# Patient Record
Sex: MALE | Race: WHITE | NOT HISPANIC OR LATINO | Employment: OTHER | URBAN - METROPOLITAN AREA
[De-identification: names, ages, dates, MRNs, and addresses within clinical notes are randomized per-mention and may not be internally consistent; named-entity substitution may affect disease eponyms.]

---

## 2023-12-29 ENCOUNTER — CLINICAL SUPPORT (OUTPATIENT)
Dept: PRIMARY CARE | Facility: CLINIC | Age: 73
End: 2023-12-29
Payer: MEDICARE

## 2023-12-29 DIAGNOSIS — Z23 NEED FOR PROPHYLACTIC VACCINATION AND INOCULATION AGAINST INFLUENZA: ICD-10-CM

## 2023-12-29 PROCEDURE — 90662 IIV NO PRSV INCREASED AG IM: CPT | Performed by: INTERNAL MEDICINE

## 2023-12-29 PROCEDURE — G0008 ADMIN INFLUENZA VIRUS VAC: HCPCS | Performed by: INTERNAL MEDICINE

## 2024-07-08 ENCOUNTER — PATIENT OUTREACH (OUTPATIENT)
Dept: PRIMARY CARE | Facility: CLINIC | Age: 74
End: 2024-07-08
Payer: MEDICARE

## 2024-07-08 NOTE — PROGRESS NOTES
Discharge facility: [facility]  Discharge diagnosis: Atrial fibrillation, persistent    Acute decompensated heart failure   Admission date: 7/2/24  Discharge date: 7/6/24    PCP Appointment Date: none scheduled, attempted x 3 to reach patient and patient's wife  Specialist Appointment Date: 8/7/24 [facility] cardiology  Hospital Encounter and Summary: Linked    Wrap Up  Wrap Up Additional Comments: Call to patient 3 attempts. Patient stated he is unable to talk. Encouraged him to call me back when he is able to talk (7/8/2024  2:27 PM)       Three attempts were made to reach patient within two business days after discharge. Voicemail left with contact information for patient to call back with any non-emergent questions or concerns.

## 2024-07-25 ENCOUNTER — PATIENT OUTREACH (OUTPATIENT)
Dept: PRIMARY CARE | Facility: CLINIC | Age: 74
End: 2024-07-25
Payer: MEDICARE

## 2024-07-25 NOTE — PROGRESS NOTES
Call after hospitalization. Spoke with patient's wife. She reports patient is doing alright. She states she is planning to call to schedule a follow up appt with PCP next week. She states patient has not been very mobile .  Addressed any questions or concerns.

## 2024-08-19 ENCOUNTER — PATIENT OUTREACH (OUTPATIENT)
Dept: PRIMARY CARE | Facility: CLINIC | Age: 74
End: 2024-08-19
Payer: MEDICARE

## 2024-08-19 RX ORDER — ACETAMINOPHEN 325 MG/1
650 TABLET ORAL EVERY 6 HOURS PRN
COMMUNITY

## 2024-08-19 RX ORDER — CHOLECALCIFEROL (VITAMIN D3) 25 MCG
1 TABLET,CHEWABLE ORAL
COMMUNITY

## 2024-08-19 RX ORDER — LANOLIN ALCOHOL/MO/W.PET/CERES
100 CREAM (GRAM) TOPICAL
COMMUNITY
Start: 2024-08-16

## 2024-08-19 RX ORDER — LOPERAMIDE HYDROCHLORIDE 2 MG/1
2 CAPSULE ORAL EVERY 8 HOURS PRN
COMMUNITY
Start: 2024-07-06

## 2024-08-19 RX ORDER — METOPROLOL SUCCINATE 50 MG/1
25 TABLET, EXTENDED RELEASE ORAL 2 TIMES DAILY
COMMUNITY
Start: 2024-03-21

## 2024-08-19 RX ORDER — ASCORBIC ACID 125 MG
500 TABLET,CHEWABLE ORAL
COMMUNITY

## 2024-08-19 RX ORDER — ATORVASTATIN CALCIUM 80 MG/1
1 TABLET, FILM COATED ORAL NIGHTLY
COMMUNITY
Start: 2023-11-07

## 2024-08-19 RX ORDER — METOLAZONE 2.5 MG/1
TABLET ORAL
COMMUNITY
Start: 2024-08-16

## 2024-08-19 RX ORDER — TORSEMIDE 20 MG/1
80 TABLET ORAL 2 TIMES DAILY
COMMUNITY
Start: 2024-04-25

## 2024-08-19 RX ORDER — POTASSIUM CHLORIDE 20 MEQ/1
40 TABLET, EXTENDED RELEASE ORAL 3 TIMES DAILY
COMMUNITY
Start: 2024-08-16

## 2024-08-19 RX ORDER — HYDROCODONE BITARTRATE AND ACETAMINOPHEN 5; 325 MG/1; MG/1
1 TABLET ORAL EVERY 6 HOURS PRN
COMMUNITY
Start: 2024-06-10

## 2024-08-19 RX ORDER — EPLERENONE 25 MG/1
25 TABLET, FILM COATED ORAL
COMMUNITY
Start: 2024-03-21

## 2024-08-19 NOTE — PROGRESS NOTES
Discharge Facility: [facility]  Discharge Diagnosis: Acute decompensated heart failure (HCC)   Acute on chronic heart failure with preserved ejection fraction  Admission Date: 8/7/24  Discharge Date: 8/16/24    PCP Appointment Date: 8/20/24  Specialist Appointment Date: Unknown  Hospital Encounter and Summary Linked: Yes    See discharge assessment below for further details    Engagement  Call Start Time: 1234 (8/19/2024 12:36 PM)    Medications  Medications reviewed with patient/caregiver?: Yes (8/19/2024 12:36 PM)  Is the patient having any side effects they believe may be caused by any medication additions or changes?: No (8/19/2024 12:36 PM)  Does the patient have all medications ordered at discharge?: Yes (8/19/2024 12:36 PM)  Care Management Interventions: No intervention needed (8/19/2024 12:36 PM)  Prescription Comments: pt sent home with script (8/19/2024 12:36 PM)  Is the patient taking all medications as directed (includes completed medication regime)?: Yes (8/19/2024 12:36 PM)  Care Management Interventions: Provided patient education (8/19/2024 12:36 PM)  Medication Comments: Pt denies problems obtaining or affording medication (8/19/2024 12:36 PM)    Appointments  Does the patient have a primary care provider?: Yes (8/19/2024 12:36 PM)  Care Management Interventions: Verified appointment date/time/provider (8/19/2024 12:36 PM)  Has the patient kept scheduled appointments due by today?: Yes (8/19/2024 12:36 PM)  Care Management Interventions: Advised patient to keep appointment (8/19/2024 12:36 PM)    Self Management  What is the home health agency?: n/a (8/19/2024 12:36 PM)  Has home health visited the patient within 72 hours of discharge?: Not applicable (8/19/2024 12:36 PM)    Patient Teaching  Does the patient have access to their discharge instructions?: Yes (8/19/2024 12:36 PM)  Care Management Interventions: Reviewed instructions with patient (8/19/2024 12:36 PM)  What is the patient's  perception of their health status since discharge?: Improving (8/19/2024 12:36 PM)  Is the patient/caregiver able to teach back the hierarchy of who to call/visit for symptoms/problems? PCP, Specialist, Home Health nurse, Urgent Care, ED, 911: Yes (8/19/2024 12:36 PM)    Wrap Up  Wrap Up Additional Comments: This CM spoke with pts spouse via phone. Spouse reports pt doing well at home since discharge. New meds reviewed. Spouse reports they have picked up all new meds and have no questions at this time. Spouse aware of my availability for non-emergent concerns. Contact info provided. (8/19/2024 12:36 PM)      Cristóbal Thompson LPN

## 2024-08-20 ENCOUNTER — TELEPHONE (OUTPATIENT)
Dept: PRIMARY CARE | Facility: CLINIC | Age: 74
End: 2024-08-20

## 2024-08-20 DIAGNOSIS — I10 PRIMARY HYPERTENSION: Primary | ICD-10-CM

## 2024-08-20 NOTE — TELEPHONE ENCOUNTER
[place] instead for hosp discharge appt.   He  BW orders to monitor kidney function/diaretics and potassium  He was in there for heart failure

## 2024-08-22 LAB — MAGNESIUM (MG/DL) IN SER/PLAS EXTERNAL: 1.9 MG/DL

## 2024-08-28 ENCOUNTER — TELEPHONE (OUTPATIENT)
Dept: PRIMARY CARE | Facility: CLINIC | Age: 74
End: 2024-08-28
Payer: MEDICARE

## 2024-08-28 NOTE — TELEPHONE ENCOUNTER
Called [facility] - PT did not have CMP drawn (did not bring that order to [facility]) when he went and had magnesium drawn.   I called PT and left vmail informing he will need to go back to lab for redraw. Too many days passed to have them add on that order.

## 2024-08-28 NOTE — TELEPHONE ENCOUNTER
----- Message from Wilfredo Calixto sent at 8/28/2024  9:09 AM EDT -----  Where is his cmp?  ----- Message -----  From: Juli Felix MA  Sent: 8/23/2024   7:41 AM EDT  To: Wilfredo Calixto MD    Magnesium results

## 2024-08-29 ENCOUNTER — PATIENT OUTREACH (OUTPATIENT)
Dept: PRIMARY CARE | Facility: CLINIC | Age: 74
End: 2024-08-29
Payer: MEDICARE

## 2024-08-29 NOTE — PROGRESS NOTES
Call regarding appt. with PCP on (N/A) after hospitalization.  At time of outreach call the patient feels as if their condition has (improved) since last visit.  Reviewed the PCP appointment with the pt and addressed any questions or concerns.    Cristóbal Thompson LPN

## 2024-10-02 ENCOUNTER — PATIENT OUTREACH (OUTPATIENT)
Dept: PRIMARY CARE | Facility: CLINIC | Age: 74
End: 2024-10-02
Payer: MEDICARE

## 2024-10-02 NOTE — PROGRESS NOTES
Unable to reach patient for discharge follow up call.   LVM with call back number for patient to call if needed   If no voicemail available call attempts x 2 were made to contact the patient to assist with any questions or concerns patient may have.    Cristóbal Thompson LPN

## 2024-10-30 ENCOUNTER — LAB (OUTPATIENT)
Dept: LAB | Facility: LAB | Age: 74
End: 2024-10-30
Payer: MEDICARE

## 2024-10-30 ENCOUNTER — APPOINTMENT (OUTPATIENT)
Dept: PRIMARY CARE | Facility: CLINIC | Age: 74
End: 2024-10-30
Payer: MEDICARE

## 2024-10-30 VITALS
HEART RATE: 87 BPM | SYSTOLIC BLOOD PRESSURE: 106 MMHG | OXYGEN SATURATION: 93 % | DIASTOLIC BLOOD PRESSURE: 60 MMHG | TEMPERATURE: 97.4 F | WEIGHT: 244 LBS | HEIGHT: 64 IN | BODY MASS INDEX: 41.66 KG/M2

## 2024-10-30 DIAGNOSIS — Z95.0 STATUS POST BIVENTRICULAR PACEMAKER: ICD-10-CM

## 2024-10-30 DIAGNOSIS — I25.10 CORONARY ARTERY DISEASE DUE TO LIPID RICH PLAQUE: ICD-10-CM

## 2024-10-30 DIAGNOSIS — I10 PRIMARY HYPERTENSION: ICD-10-CM

## 2024-10-30 DIAGNOSIS — M10.9 GOUT, UNSPECIFIED CAUSE, UNSPECIFIED CHRONICITY, UNSPECIFIED SITE: ICD-10-CM

## 2024-10-30 DIAGNOSIS — O99.019: ICD-10-CM

## 2024-10-30 DIAGNOSIS — Z00.00 WELLNESS EXAMINATION: ICD-10-CM

## 2024-10-30 DIAGNOSIS — N40.0 BENIGN PROSTATIC HYPERPLASIA WITHOUT LOWER URINARY TRACT SYMPTOMS: ICD-10-CM

## 2024-10-30 DIAGNOSIS — D64.9 ANEMIA, UNSPECIFIED TYPE: ICD-10-CM

## 2024-10-30 DIAGNOSIS — R53.81 DEBILITY: ICD-10-CM

## 2024-10-30 DIAGNOSIS — Z00.00 MEDICARE ANNUAL WELLNESS VISIT, INITIAL: Primary | ICD-10-CM

## 2024-10-30 DIAGNOSIS — G89.4 CHRONIC PAIN SYNDROME: ICD-10-CM

## 2024-10-30 DIAGNOSIS — F41.9 ANXIETY: ICD-10-CM

## 2024-10-30 DIAGNOSIS — K21.9 GASTROESOPHAGEAL REFLUX DISEASE WITHOUT ESOPHAGITIS: ICD-10-CM

## 2024-10-30 DIAGNOSIS — E78.2 MIXED HYPERLIPIDEMIA: ICD-10-CM

## 2024-10-30 DIAGNOSIS — I50.82 BIVENTRICULAR CONGESTIVE HEART FAILURE: ICD-10-CM

## 2024-10-30 DIAGNOSIS — Z95.2 S/P AVR: ICD-10-CM

## 2024-10-30 DIAGNOSIS — I87.2 VENOUS STASIS DERMATITIS OF BOTH LOWER EXTREMITIES: ICD-10-CM

## 2024-10-30 DIAGNOSIS — M19.90 ARTHRITIS: ICD-10-CM

## 2024-10-30 DIAGNOSIS — I50.41 ACUTE COMBINED SYSTOLIC (CONGESTIVE) AND DIASTOLIC (CONGESTIVE) HEART FAILURE: ICD-10-CM

## 2024-10-30 DIAGNOSIS — I25.83 CORONARY ARTERY DISEASE DUE TO LIPID RICH PLAQUE: ICD-10-CM

## 2024-10-30 DIAGNOSIS — I27.20 PULMONARY HYPERTENSION (MULTI): ICD-10-CM

## 2024-10-30 DIAGNOSIS — N28.9 RENAL INSUFFICIENCY: ICD-10-CM

## 2024-10-30 DIAGNOSIS — Z00.00 MEDICARE ANNUAL WELLNESS VISIT, INITIAL: ICD-10-CM

## 2024-10-30 DIAGNOSIS — O09.40: ICD-10-CM

## 2024-10-30 DIAGNOSIS — R73.02 IGT (IMPAIRED GLUCOSE TOLERANCE): ICD-10-CM

## 2024-10-30 DIAGNOSIS — F10.10 ALCOHOL ABUSE: ICD-10-CM

## 2024-10-30 DIAGNOSIS — C61 PROSTATE CANCER (MULTI): ICD-10-CM

## 2024-10-30 LAB
ALBUMIN SERPL BCP-MCNC: 3.9 G/DL (ref 3.4–5)
ALP SERPL-CCNC: 131 U/L (ref 33–136)
ALT SERPL W P-5'-P-CCNC: 12 U/L (ref 10–52)
ANION GAP SERPL CALC-SCNC: 20 MMOL/L (ref 10–20)
AST SERPL W P-5'-P-CCNC: 15 U/L (ref 9–39)
BILIRUB SERPL-MCNC: 0.4 MG/DL (ref 0–1.2)
BNP SERPL-MCNC: 360 PG/ML (ref 0–99)
BUN SERPL-MCNC: 75 MG/DL (ref 6–23)
CALCIUM SERPL-MCNC: 8.7 MG/DL (ref 8.6–10.3)
CHLORIDE SERPL-SCNC: 94 MMOL/L (ref 98–107)
CO2 SERPL-SCNC: 29 MMOL/L (ref 21–32)
CREAT SERPL-MCNC: 1.7 MG/DL (ref 0.5–1.3)
EGFRCR SERPLBLD CKD-EPI 2021: 42 ML/MIN/1.73M*2
ERYTHROCYTE [DISTWIDTH] IN BLOOD BY AUTOMATED COUNT: 14.6 % (ref 11.5–14.5)
GLUCOSE SERPL-MCNC: 126 MG/DL (ref 74–99)
HCT VFR BLD AUTO: 37.1 % (ref 41–52)
HGB BLD-MCNC: 11.5 G/DL (ref 13.5–17.5)
MAGNESIUM SERPL-MCNC: 1.98 MG/DL (ref 1.6–2.4)
MCH RBC QN AUTO: 32.1 PG (ref 26–34)
MCHC RBC AUTO-ENTMCNC: 31 G/DL (ref 32–36)
MCV RBC AUTO: 104 FL (ref 80–100)
NRBC BLD-RTO: 0 /100 WBCS (ref 0–0)
PLATELET # BLD AUTO: 107 X10*3/UL (ref 150–450)
POTASSIUM SERPL-SCNC: 4.2 MMOL/L (ref 3.5–5.3)
PROT SERPL-MCNC: 6.2 G/DL (ref 6.4–8.2)
RBC # BLD AUTO: 3.58 X10*6/UL (ref 4.5–5.9)
SODIUM SERPL-SCNC: 139 MMOL/L (ref 136–145)
WBC # BLD AUTO: 10.3 X10*3/UL (ref 4.4–11.3)

## 2024-10-30 PROCEDURE — G0439 PPPS, SUBSEQ VISIT: HCPCS | Performed by: INTERNAL MEDICINE

## 2024-10-30 PROCEDURE — 83880 ASSAY OF NATRIURETIC PEPTIDE: CPT

## 2024-10-30 PROCEDURE — G0008 ADMIN INFLUENZA VIRUS VAC: HCPCS | Performed by: INTERNAL MEDICINE

## 2024-10-30 PROCEDURE — 1124F ACP DISCUSS-NO DSCNMKR DOCD: CPT | Performed by: INTERNAL MEDICINE

## 2024-10-30 PROCEDURE — 99397 PER PM REEVAL EST PAT 65+ YR: CPT | Performed by: INTERNAL MEDICINE

## 2024-10-30 PROCEDURE — 80053 COMPREHEN METABOLIC PANEL: CPT

## 2024-10-30 PROCEDURE — 90662 IIV NO PRSV INCREASED AG IM: CPT | Performed by: INTERNAL MEDICINE

## 2024-10-30 PROCEDURE — 85027 COMPLETE CBC AUTOMATED: CPT

## 2024-10-30 PROCEDURE — 83735 ASSAY OF MAGNESIUM: CPT

## 2024-10-30 PROCEDURE — 1170F FXNL STATUS ASSESSED: CPT | Performed by: INTERNAL MEDICINE

## 2024-10-30 PROCEDURE — 1159F MED LIST DOCD IN RCRD: CPT | Performed by: INTERNAL MEDICINE

## 2024-10-30 PROCEDURE — 3008F BODY MASS INDEX DOCD: CPT | Performed by: INTERNAL MEDICINE

## 2024-10-30 PROCEDURE — 3078F DIAST BP <80 MM HG: CPT | Performed by: INTERNAL MEDICINE

## 2024-10-30 PROCEDURE — 3074F SYST BP LT 130 MM HG: CPT | Performed by: INTERNAL MEDICINE

## 2024-10-30 PROCEDURE — 99213 OFFICE O/P EST LOW 20 MIN: CPT | Performed by: INTERNAL MEDICINE

## 2024-10-30 PROCEDURE — 36415 COLL VENOUS BLD VENIPUNCTURE: CPT

## 2024-10-30 RX ORDER — OXYCODONE AND ACETAMINOPHEN 5; 325 MG/1; MG/1
TABLET ORAL EVERY 6 HOURS
COMMUNITY
Start: 2024-02-05

## 2024-10-30 RX ORDER — TAMSULOSIN HYDROCHLORIDE 0.4 MG/1
0.8 CAPSULE ORAL
COMMUNITY
Start: 2024-06-26

## 2024-10-30 RX ORDER — GABAPENTIN 300 MG/1
300 CAPSULE ORAL 2 TIMES DAILY
COMMUNITY
Start: 2024-10-02

## 2024-10-30 RX ORDER — ALLOPURINOL 300 MG/1
300 TABLET ORAL EVERY 24 HOURS
COMMUNITY

## 2024-10-30 ASSESSMENT — PATIENT HEALTH QUESTIONNAIRE - PHQ9
SUM OF ALL RESPONSES TO PHQ9 QUESTIONS 1 AND 2: 0
1. LITTLE INTEREST OR PLEASURE IN DOING THINGS: NOT AT ALL
2. FEELING DOWN, DEPRESSED OR HOPELESS: NOT AT ALL

## 2024-10-30 ASSESSMENT — ACTIVITIES OF DAILY LIVING (ADL)
DOING_HOUSEWORK: NEEDS ASSISTANCE
GROCERY_SHOPPING: NEEDS ASSISTANCE
DRESSING: NEEDS ASSISTANCE
MANAGING_FINANCES: NEEDS ASSISTANCE
BATHING: NEEDS ASSISTANCE
TAKING_MEDICATION: NEEDS ASSISTANCE

## 2024-11-13 ENCOUNTER — PATIENT OUTREACH (OUTPATIENT)
Dept: PRIMARY CARE | Facility: CLINIC | Age: 74
End: 2024-11-13
Payer: MEDICARE

## 2024-11-13 NOTE — PROGRESS NOTES
Patient has met target of no readmission for (90) days post (hospital, SNF, rehab) discharge and is graduated from Transitional Care Management program at this time.    Cristóbal Thompson LPN

## 2025-02-24 ENCOUNTER — DOCUMENTATION (OUTPATIENT)
Dept: PRIMARY CARE | Facility: CLINIC | Age: 75
End: 2025-02-24
Payer: MEDICARE

## 2025-02-24 ENCOUNTER — PATIENT OUTREACH (OUTPATIENT)
Dept: PRIMARY CARE | Facility: CLINIC | Age: 75
End: 2025-02-24
Payer: MEDICARE

## 2025-02-24 NOTE — PROGRESS NOTES
Discharge Facility:[facility]  Discharge Diagnosis:  Acute on Chronic CHF  Venous Stasis B/L LE  CKD 3b  Anxiety/Depression  Debility    Admission Date:1/9/2025  Discharge Date: 2/22/2025    PCP Appointment Date:TBD  Specialist Appointment Date:   3/21/2025 Cardio/[facility]  Urology to remove [facility] Encounter and Summary Linked: Yes  [facility]: 12/18/2024-1/9/2025  See discharge assessment below for further details  Wrap Up  Wrap Up Additional Comments: -- (Spoke with wife Samantha. She states Nilton is trying to reaclimate to being at home, he is very weak, got Covid at Rehab. Has not been able to walk or stand very long. They are monitoring PO2, which has been good.) (2/24/2025  3:31 PM)    Engagement  Call Start Time: 1520 (2/24/2025  3:31 PM)    Medications  Medications reviewed with patient/caregiver?: Yes (2/24/2025  3:31 PM)  Is the patient having any side effects they believe may be caused by any medication additions or changes?: No (2/24/2025  3:31 PM)  Does the patient have all medications ordered at discharge?: Not applicable (2/24/2025  3:31 PM)  Care Management Interventions: No intervention needed (2/24/2025  3:31 PM)  Prescription Comments: -- (N/A) (2/24/2025  3:31 PM)  Is the patient taking all medications as directed (includes completed medication regime)?: Yes (2/24/2025  3:31 PM)  Medication Comments: -- (N/A) (2/24/2025  3:31 PM)    Appointments  Does the patient have a primary care provider?: Yes (2/24/2025  3:31 PM)  Care Management Interventions: -- (Wants to wait a few days before scheduling.) (2/24/2025  3:31 PM)  Has the patient kept scheduled appointments due by today?: Yes (2/24/2025  3:31 PM)    Self Management  What is the home health agency?: -- ([facility]) (2/24/2025  3:31 PM)  Has home health visited the patient within 72 hours of discharge?: Call prior to 72 hours (To call later today to schedule first visit) (2/24/2025  3:31 PM)  What  Durable Medical Equipment (DME) was ordered?: -- (Using wheelchair mostly for mobility) (2/24/2025  3:31 PM)    Patient Teaching  Does the patient have access to their discharge instructions?: Yes (2/24/2025  3:31 PM)  Care Management Interventions: Reviewed instructions with patient (To do daily weights and keep record, Low sodium diet) (2/24/2025  3:31 PM)  What is the patient's perception of their health status since discharge?: Improving (2/24/2025  3:31 PM)  Is the patient/caregiver able to teach back the hierarchy of who to call/visit for symptoms/problems? PCP, Specialist, Home Health nurse, Urgent Care, ED, 911: Yes (2/24/2025  3:31 PM)

## 2025-03-10 ENCOUNTER — PATIENT OUTREACH (OUTPATIENT)
Dept: PRIMARY CARE | Facility: CLINIC | Age: 75
End: 2025-03-10
Payer: MEDICARE

## 2025-03-10 NOTE — PROGRESS NOTES
Call after hospitalization.  At time of outreach call the patient feels as if their condition has improved since last visit.  Spoke to Samantha. She states that Nilton is improving with PT.

## 2025-03-26 DIAGNOSIS — I87.2 VENOUS STASIS DERMATITIS OF BOTH LOWER EXTREMITIES: Primary | ICD-10-CM

## 2025-03-26 DIAGNOSIS — M10.9 GOUT, UNSPECIFIED CAUSE, UNSPECIFIED CHRONICITY, UNSPECIFIED SITE: ICD-10-CM

## 2025-03-26 RX ORDER — BUSPIRONE HYDROCHLORIDE 10 MG/1
10 TABLET ORAL 2 TIMES DAILY
Qty: 60 TABLET | Refills: 11 | Status: SHIPPED | OUTPATIENT
Start: 2025-03-26 | End: 2026-03-26

## 2025-03-26 RX ORDER — TIZANIDINE 2 MG/1
2 TABLET ORAL EVERY 8 HOURS PRN
Qty: 30 TABLET | Refills: 3 | Status: SHIPPED | OUTPATIENT
Start: 2025-03-26 | End: 2025-04-05

## 2025-04-10 ENCOUNTER — PATIENT OUTREACH (OUTPATIENT)
Dept: PRIMARY CARE | Facility: CLINIC | Age: 75
End: 2025-04-10
Payer: MEDICARE

## 2025-04-10 NOTE — PROGRESS NOTES
Call after hospitalization.  At time of outreach call the patient feels as if their condition has improved since last visit.  Samantha states things are going pretty well, Nurse comes 3 x weekly and NP on Wed. Has his appointments lined up.

## 2025-04-11 ENCOUNTER — TELEPHONE (OUTPATIENT)
Dept: PRIMARY CARE | Facility: CLINIC | Age: 75
End: 2025-04-11
Payer: MEDICARE

## 2025-04-11 NOTE — TELEPHONE ENCOUNTER
PATIENTS WIFE LEFT VOICEMAIL ON RX LINE STATING THEY HAD HOME NURSING OUT TO THE HOUSE TODAY AND NURSE SAID HE HAS FLUID IN HIS LUNGS  WIFE WANTS TO SEE IF YOU WOULD CALL IN A SCRIPT FOR HIM OR WHAT SHOULD SHE DO? PLEASE ADVISE

## 2025-04-29 DIAGNOSIS — J45.20 MILD INTERMITTENT ASTHMA, UNSPECIFIED WHETHER COMPLICATED (HHS-HCC): Primary | ICD-10-CM

## 2025-04-29 RX ORDER — ALBUTEROL SULFATE 90 UG/1
INHALANT RESPIRATORY (INHALATION)
Qty: 18 G | Refills: 1 | Status: SHIPPED | OUTPATIENT
Start: 2025-04-29

## 2025-06-09 ENCOUNTER — TELEPHONE (OUTPATIENT)
Dept: PRIMARY CARE | Facility: CLINIC | Age: 75
End: 2025-06-09
Payer: MEDICARE

## 2025-06-16 ENCOUNTER — APPOINTMENT (OUTPATIENT)
Dept: PRIMARY CARE | Facility: CLINIC | Age: 75
End: 2025-06-16
Payer: MEDICARE

## 2025-06-18 ENCOUNTER — APPOINTMENT (OUTPATIENT)
Dept: PRIMARY CARE | Facility: CLINIC | Age: 75
End: 2025-06-18
Payer: MEDICARE

## 2025-06-18 VITALS
TEMPERATURE: 97.9 F | SYSTOLIC BLOOD PRESSURE: 100 MMHG | HEIGHT: 64 IN | WEIGHT: 217 LBS | BODY MASS INDEX: 37.05 KG/M2 | DIASTOLIC BLOOD PRESSURE: 60 MMHG | HEART RATE: 63 BPM | OXYGEN SATURATION: 93 %

## 2025-06-18 DIAGNOSIS — I50.82 BIVENTRICULAR CONGESTIVE HEART FAILURE: ICD-10-CM

## 2025-06-18 DIAGNOSIS — I50.23 ACUTE ON CHRONIC SYSTOLIC (CONGESTIVE) HEART FAILURE: ICD-10-CM

## 2025-06-18 DIAGNOSIS — R50.9 FEVER, UNSPECIFIED FEVER CAUSE: Primary | ICD-10-CM

## 2025-06-18 DIAGNOSIS — E66.01 MORBID OBESITY (MULTI): ICD-10-CM

## 2025-06-18 DIAGNOSIS — E66.813 OBESITY, CLASS III, BMI 40-49.9 (MORBID OBESITY): ICD-10-CM

## 2025-06-18 DIAGNOSIS — R53.81 DEBILITY: ICD-10-CM

## 2025-06-18 DIAGNOSIS — R41.82 ALTERED MENTAL STATUS, UNSPECIFIED ALTERED MENTAL STATUS TYPE: ICD-10-CM

## 2025-06-18 DIAGNOSIS — I50.33 ACUTE ON CHRONIC DIASTOLIC HEART FAILURE: ICD-10-CM

## 2025-06-18 DIAGNOSIS — F10.20 ALCOHOLISM (MULTI): ICD-10-CM

## 2025-06-18 DIAGNOSIS — N18.4 STAGE 4 CHRONIC KIDNEY DISEASE (MULTI): ICD-10-CM

## 2025-06-18 DIAGNOSIS — C61 PROSTATE CANCER (MULTI): ICD-10-CM

## 2025-06-18 LAB
POC BINAX EXPIRATION: NORMAL
POC BINAX NOW COVID SERIAL NUMBER: NORMAL
POC RAPID INFLUENZA A: NEGATIVE
POC RAPID INFLUENZA B: NEGATIVE
POC SARS-COV-2 AG BINAX: NORMAL

## 2025-06-18 PROCEDURE — 3008F BODY MASS INDEX DOCD: CPT | Performed by: INTERNAL MEDICINE

## 2025-06-18 PROCEDURE — 87804 INFLUENZA ASSAY W/OPTIC: CPT | Performed by: INTERNAL MEDICINE

## 2025-06-18 PROCEDURE — 3078F DIAST BP <80 MM HG: CPT | Performed by: INTERNAL MEDICINE

## 2025-06-18 PROCEDURE — 3074F SYST BP LT 130 MM HG: CPT | Performed by: INTERNAL MEDICINE

## 2025-06-18 PROCEDURE — 1159F MED LIST DOCD IN RCRD: CPT | Performed by: INTERNAL MEDICINE

## 2025-06-18 PROCEDURE — 87811 SARS-COV-2 COVID19 W/OPTIC: CPT | Performed by: INTERNAL MEDICINE

## 2025-06-18 PROCEDURE — 1123F ACP DISCUSS/DSCN MKR DOCD: CPT | Performed by: INTERNAL MEDICINE

## 2025-06-18 PROCEDURE — 99214 OFFICE O/P EST MOD 30 MIN: CPT | Performed by: INTERNAL MEDICINE

## 2025-06-18 PROCEDURE — G2211 COMPLEX E/M VISIT ADD ON: HCPCS | Performed by: INTERNAL MEDICINE

## 2025-06-18 RX ORDER — FERROUS SULFATE 325(65) MG
1 TABLET ORAL 2 TIMES DAILY
COMMUNITY
Start: 2025-06-05

## 2025-06-18 RX ORDER — GABAPENTIN 400 MG/1
400 CAPSULE ORAL 2 TIMES DAILY
COMMUNITY
Start: 2025-06-05

## 2025-06-18 RX ORDER — CHOLECALCIFEROL (VITAMIN D3) 25 MCG
1000 TABLET ORAL
COMMUNITY
Start: 2025-06-05

## 2025-06-18 RX ORDER — VIT A/VIT C/VIT E/ZINC/COPPER 4296-226
1 CAPSULE ORAL
COMMUNITY

## 2025-06-18 RX ORDER — ZINC SULFATE 50(220)MG
220 CAPSULE ORAL
COMMUNITY

## 2025-06-18 RX ORDER — LIDOCAINE 560 MG/1
2 PATCH PERCUTANEOUS; TOPICAL; TRANSDERMAL
COMMUNITY
Start: 2025-06-06

## 2025-06-18 RX ORDER — OXYCODONE HYDROCHLORIDE 5 MG/1
5 TABLET ORAL EVERY 4 HOURS PRN
COMMUNITY
Start: 2025-06-15

## 2025-06-18 RX ORDER — PANTOPRAZOLE SODIUM 20 MG/1
20 TABLET, DELAYED RELEASE ORAL
COMMUNITY

## 2025-06-18 NOTE — PROGRESS NOTES
"Subjective   Patient ID: Nilton Uribe is a 75 y.o. male who presents for Hospital Follow-up (Hospital follow up on infection in legs and kidney failure ).    HPI complex male severe cardiac disease   Renal stage 4 , Dr Walsh   Manages med     Seen this am 101.7 temp     Says he does not have any symptoms    Maybe uti    No dysuria     He did eat today       Review of Systems    Objective   /60   Pulse 63   Temp 36.6 °C (97.9 °F)   Ht 1.626 m (5' 4\")   Wt 98.4 kg (217 lb)   SpO2 93%   BMI 37.25 kg/m²     Physical Exam  Wheelchair bound  Fatigued   Slow speech   Legs wrapped   Assessment/Plan   Diagnoses and all orders for this visit:  Fever, unspecified fever cause  Comments:  se order s see labs  Altered mental status, unspecified altered mental status type  Comments:  see orders labs  Stage 4 chronic kidney disease (Multi)  Comments:  nephro  Biventricular congestive heart failure  Comments:  sign  Debility  Comments:  doing     "

## 2025-07-10 ENCOUNTER — APPOINTMENT (OUTPATIENT)
Dept: CARDIOLOGY | Facility: CLINIC | Age: 75
End: 2025-07-10
Payer: MEDICARE

## 2025-07-10 VITALS
WEIGHT: 217 LBS | HEART RATE: 63 BPM | SYSTOLIC BLOOD PRESSURE: 118 MMHG | BODY MASS INDEX: 36.15 KG/M2 | OXYGEN SATURATION: 94 % | DIASTOLIC BLOOD PRESSURE: 52 MMHG | HEIGHT: 65 IN

## 2025-07-10 DIAGNOSIS — S81.802A WOUND OF LEFT LEG: ICD-10-CM

## 2025-07-10 DIAGNOSIS — I27.20 PULMONARY HYPERTENSION (MULTI): ICD-10-CM

## 2025-07-10 DIAGNOSIS — S81.801A WOUND OF RIGHT LEG: ICD-10-CM

## 2025-07-10 DIAGNOSIS — I50.82 BIVENTRICULAR CONGESTIVE HEART FAILURE: ICD-10-CM

## 2025-07-10 DIAGNOSIS — R60.0 EDEMA LEG: ICD-10-CM

## 2025-07-10 DIAGNOSIS — E78.2 MIXED HYPERLIPIDEMIA: ICD-10-CM

## 2025-07-10 DIAGNOSIS — I87.2 VENOUS INSUFFICIENCY: Primary | ICD-10-CM

## 2025-07-10 PROBLEM — E87.1 HYPONATREMIA: Status: ACTIVE | Noted: 2025-05-09

## 2025-07-10 PROBLEM — N18.32 STAGE 3B CHRONIC KIDNEY DISEASE (MULTI): Status: ACTIVE | Noted: 2024-12-16

## 2025-07-10 PROBLEM — I83.019 VENOUS STASIS ULCERS OF BOTH LOWER EXTREMITIES: Status: ACTIVE | Noted: 2024-12-23

## 2025-07-10 PROBLEM — M25.559 HIP PAIN: Status: ACTIVE | Noted: 2025-01-09

## 2025-07-10 PROBLEM — Z98.890 S/P LEFT ATRIAL APPENDAGE LIGATION: Status: ACTIVE | Noted: 2024-12-14

## 2025-07-10 PROBLEM — Z98.890 STATUS POST MITRAL VALVE REPAIR: Status: ACTIVE | Noted: 2024-12-14

## 2025-07-10 PROBLEM — I83.029 VENOUS STASIS ULCERS OF BOTH LOWER EXTREMITIES: Status: ACTIVE | Noted: 2024-12-23

## 2025-07-10 PROBLEM — E87.5 HYPERKALEMIA: Status: ACTIVE | Noted: 2025-05-09

## 2025-07-10 PROBLEM — E66.01 MORBID OBESITY (MULTI): Status: RESOLVED | Noted: 2025-06-18 | Resolved: 2025-07-10

## 2025-07-10 PROBLEM — M41.9 SCOLIOSIS DEFORMITY OF SPINE: Status: ACTIVE | Noted: 2024-12-16

## 2025-07-10 PROBLEM — L97.919 VENOUS STASIS ULCERS OF BOTH LOWER EXTREMITIES: Status: ACTIVE | Noted: 2024-12-23

## 2025-07-10 PROBLEM — Z85.46 HISTORY OF PROSTATE CANCER: Status: ACTIVE | Noted: 2024-12-16

## 2025-07-10 PROBLEM — L89.326 PRESSURE INJURY OF DEEP TISSUE OF LEFT BUTTOCK: Status: ACTIVE | Noted: 2025-01-07

## 2025-07-10 PROBLEM — Z96.643 STATUS POST BILATERAL HIP REPLACEMENTS: Status: ACTIVE | Noted: 2024-12-16

## 2025-07-10 PROBLEM — L97.929 VENOUS STASIS ULCERS OF BOTH LOWER EXTREMITIES: Status: ACTIVE | Noted: 2024-12-23

## 2025-07-10 PROBLEM — K57.90 DIVERTICULAR DISEASE: Status: ACTIVE | Noted: 2018-01-31

## 2025-07-10 PROBLEM — R29.6 FREQUENT FALLS: Status: ACTIVE | Noted: 2024-12-16

## 2025-07-10 PROBLEM — M17.9 OSTEOARTHRITIS OF KNEE: Status: ACTIVE | Noted: 2017-05-19

## 2025-07-10 PROBLEM — M15.0 PRIMARY OSTEOARTHRITIS INVOLVING MULTIPLE JOINTS: Status: ACTIVE | Noted: 2025-01-11

## 2025-07-10 PROBLEM — N28.9 RENAL INSUFFICIENCY: Status: RESOLVED | Noted: 2024-10-30 | Resolved: 2025-07-10

## 2025-07-10 PROBLEM — I97.190 COMPLETE ATRIOVENTRICULAR BLOCK DUE TO ATRIOVENTRICULAR NODAL ABLATION (MULTI): Status: ACTIVE | Noted: 2024-12-14

## 2025-07-10 PROBLEM — I44.2 COMPLETE ATRIOVENTRICULAR BLOCK DUE TO ATRIOVENTRICULAR NODAL ABLATION (MULTI): Status: ACTIVE | Noted: 2024-12-14

## 2025-07-10 PROBLEM — E66.812 CLASS 2 OBESITY WITH BODY MASS INDEX (BMI) OF 37.0 TO 37.9 IN ADULT: Status: ACTIVE | Noted: 2025-06-18

## 2025-07-10 PROBLEM — I35.0 NONRHEUMATIC AORTIC VALVE STENOSIS: Status: ACTIVE | Noted: 2018-02-03

## 2025-07-10 PROBLEM — I34.0 NON-RHEUMATIC MITRAL REGURGITATION: Status: ACTIVE | Noted: 2018-02-03

## 2025-07-10 PROBLEM — K92.2 GASTROINTESTINAL HEMORRHAGE: Status: ACTIVE | Noted: 2024-12-16

## 2025-07-10 PROBLEM — G47.33 OBSTRUCTIVE SLEEP APNEA SYNDROME: Status: ACTIVE | Noted: 2024-12-16

## 2025-07-10 PROBLEM — I36.1 NON-RHEUMATIC TRICUSPID VALVE INSUFFICIENCY: Status: ACTIVE | Noted: 2019-01-09

## 2025-07-10 PROCEDURE — 3078F DIAST BP <80 MM HG: CPT | Performed by: INTERNAL MEDICINE

## 2025-07-10 PROCEDURE — 99205 OFFICE O/P NEW HI 60 MIN: CPT | Performed by: INTERNAL MEDICINE

## 2025-07-10 PROCEDURE — 3074F SYST BP LT 130 MM HG: CPT | Performed by: INTERNAL MEDICINE

## 2025-07-10 PROCEDURE — 1159F MED LIST DOCD IN RCRD: CPT | Performed by: INTERNAL MEDICINE

## 2025-07-10 PROCEDURE — 3008F BODY MASS INDEX DOCD: CPT | Performed by: INTERNAL MEDICINE

## 2025-07-10 RX ORDER — GABAPENTIN 600 MG/1
600 TABLET ORAL NIGHTLY
COMMUNITY
Start: 2025-06-05

## 2025-07-10 RX ORDER — AMOXICILLIN 250 MG
1 CAPSULE ORAL 2 TIMES DAILY
COMMUNITY
Start: 2025-01-09

## 2025-07-10 RX ORDER — ACETAMINOPHEN 500 MG
500 TABLET ORAL AS NEEDED
COMMUNITY

## 2025-07-10 RX ORDER — OXYCODONE AND ACETAMINOPHEN 7.5; 325 MG/1; MG/1
1 TABLET ORAL EVERY 4 HOURS PRN
COMMUNITY
Start: 2025-06-25

## 2025-07-10 NOTE — LETTER
July 13, 2025     Wilfredo Calixto MD  [address]  [place] [id number]    Patient: Nilton Uribe   YOB: 1950   Date of Visit: 7/10/2025       Dear Dr. Wilfredo Calixto MD:    Thank you for referring Nilton Uribe to me for evaluation. Below are my notes for this consultation.  If you have questions, please do not hesitate to call me. I look forward to following your patient along with you.       Sincerely,     Lloyd Quiles MD      CC: No Recipients  ______________________________________________________________________________________    PCP: Wilfredo Calixto MD      Subjective  History of Present Illness  Nilton Uribe is a 75 year old male with heart and kidney failure who presents with severe leg wounds. He is accompanied by his wife. Pt is cousin of Dr. Chavez.    He has severe leg wounds covering 70% to 85% of his legs, with significant pain and burning sensations. The wounds have not improved despite treatment with [employer] and silver alginate dressings, and some areas have worsened. Dressing changes are challenging due to adherence, requiring extensive use of saline solution.    He has heart failure, atrial fibrillation, aortic stenosis, and kidney failure. He underwent open heart surgery with valve replacement and repairs and has a biventricular pacemaker. He is not on dialysis. He has not had recent venous insufficiency testing, though he had a vein cauterized in his thirties.    He receives home healthcare through the [facility], and a nurse recently swabbed the wounds to check for infection. He has been to the wound center at [facility] once and has a follow-up appointment scheduled. He is under the care of a cardiologist and a nephrologist and attends a heart failure clinic to manage fluid overload.    He has been hospitalized multiple times for fluid overload and is currently unable to walk or get in and out of a car without assistance, requiring  "hired transport for medical appointments.    Review of Systems:  Otherwise, limited cardiovascular review of systems is negative.    Past Medical History:  He has no past medical history on file.    Surgical History:   He has no past surgical history on file.    Family History:   Family History[1]Family History[2]    Social History:   Tobacco Use: Unknown (7/10/2025)    Patient History    • Smoking Tobacco Use: Unknown    • Smokeless Tobacco Use: Never    • Passive Exposure: Not on file       Outpatient Medications:  Current Medications[3]     Allergies:  Adhesive tape-silicones       Objective  Vital Signs:  /52 (BP Location: Left arm, Patient Position: Sitting, BP Cuff Size: Adult)   Pulse 63   Ht 1.651 m (5' 5\")   Wt 98.4 kg (217 lb)   SpO2 94%   BMI 36.11 kg/m²     Physical Exam:  General: no acute distress  HEENT: EOMI  Lungs: Good air movement bilaterally with no wheezing/crackles/rhonchi  Cardiovascular: Regular rhythm without murmurs/rubs/gallops; JVP is normal  No carotid bruits present  Abdomen: Soft  Extremities: 2+ edema BLE and severe wounds bilaterally - see below.  Neurologic: Alert and oriented x3.              Pertinent Recent Cardiovascular Studies:  Results  DIAGNOSTIC  Echocardiogram in [place]: Left ventricular ejection fraction 62%, right ventricular enlargement with reduced systolic function, Shankar aortic valve replacement normal (12/2024)    Laboratory values:  CMP:  Recent Labs     10/30/24  1530 08/22/24  0000 02/01/23  1613 05/20/22  1325 10/22/18  1403     --  145 142 144   K 4.2  --  3.6 4.2 3.6   CL 94*  --  99 100 100   CO2 29  --  31 30 33*   ANIONGAP 20  --  19 16 15   BUN 75*  --  21 32* 25*   CREATININE 1.70*  --  1.14 1.43* 1.20   EGFR 42*  --   --   --   --    MG 1.98 1.9  --   --   --      Recent Labs     10/30/24  1530 10/22/18  1403   ALBUMIN 3.9 4.3   ALKPHOS 131 202*   ALT 12 13   AST 15 17   BILITOT 0.4 0.6     CBC:  Recent Labs     10/30/24  1530 " 10/22/18  1403   WBC 10.3 7.7   HGB 11.5* 10.3*   HCT 37.1* 33.2*   * 219   * 98     HEME/ENDO:  Recent Labs     10/22/18  1403   TSH 1.55   HGBA1C 4.8      CARDIAC:   Recent Labs     10/30/24  1530   *     Lipid 7/2/24 [facility]:      LDL 86      I have personally reviewed most recent PCP, cardiology, vascular, and/or podiatry documentation.      Assessment/Plan  Assessment & Plan  Chronic venous insufficiency with ulceration  Extensive leg ulceration due to suspected venous insufficiency, possibly worsened by lymphedema or heart failure. Current wound care regimen ineffective.  - Order venous insufficiency testing.  - Refer to Dr. Lou for evaluation and potential treatment.  - Coordinate with wound care center for ulcer management - spoke with Dr. Chavez, agree with [facility] wound care for Dr. Guillaume and Dr. King.    Heart failure with preserved ejection fraction  Right heart dysfunction may contribute to venous congestion and leg swelling. Further evaluation needed.  - Order complete echocardiogram to assess right heart function and pulmonary pressures.  - Coordinate echocardiogram with venous insufficiency testing.    Atrial fibrillation  Managed with biventricular pacemaker for resynchronized therapy.    Aortic valve replacement  Shankar valve functioning normally as per recent echocardiogram.    Chronic kidney disease, unspecified  Managed by Dr. Steve, not on dialysis.    Mobility issues  Mobility impaired by leg ulceration and pain.  - Coordinate transport for medical appointments.    Communicated with Dr. Chavez and Dr. Guillaume.    I have personally reviewed and interpreted the following labs:  Hgb 7.8 (6/18/25 [facility]) - anemia, but seems c/w with prior values  Cr 1.6 (6/18/25 [facility]) - stable known CKD  Albumin 3.1 (6/18/25 [facility]) - slightly decreased  LDL 86 (7/2/24 [facility]) - adequate       This medical note was created with the assistance of artificial intelligence (AI) for  documentation purposes. The content has been reviewed and confirmed by the healthcare provider for accuracy and completeness. Patient consented to the use of audio recording and use of AI during their visit.       Lloyd Quiles MD, FACC, FSCAI, RPVI  Co-Director, Vascular Center, and  Co-Director, Pulmonary Embolism Response Team,  [facility] [place]                           Associate Professor of Medicine,                                                                [employer]             [1]  No family history on file.  [2]  No family history on file.  [3]    Current Outpatient Medications:   •  acetaminophen (Tylenol) 500 mg tablet, Take 1 tablet (500 mg) by mouth if needed., Disp: , Rfl:   •  albuterol 108 (90 Base) MCG/ACT inhaler, Inhale 2 puffs if needed., Disp: , Rfl:   •  allopurinol (Zyloprim) 300 mg tablet, Take 1 tablet (300 mg) by mouth once every 24 hours., Disp: , Rfl:   •  aspirin (ASPIR-81 ORAL), Take 1 tablet by mouth once daily., Disp: , Rfl:   •  atorvastatin (Lipitor) 80 mg tablet, Take 1 tablet (80 mg) by mouth once daily at bedtime., Disp: , Rfl:   •  busPIRone (Buspar) 10 mg tablet, Take 1 tablet (10 mg) by mouth 2 times a day., Disp: 60 tablet, Rfl: 11  •  cholecalciferol (Vitamin D-3) 25 mcg (1,000 units) tablet, Take 1 tablet (1,000 Units) by mouth once daily., Disp: , Rfl:   •  cyanocobalamin, vitamin B-12, 3,000 mcg capsule, Take 1 capsule by mouth once daily., Disp: , Rfl:   •  ferrous sulfate 325 mg (65 mg elemental) tablet, Take 1 tablet by mouth twice a day., Disp: , Rfl:   •  gabapentin (Neurontin) 400 mg capsule, Take 1 capsule (400 mg) by mouth 2 times a day., Disp: , Rfl:   •  gabapentin (Neurontin) 600 mg tablet, Take 1 tablet (600 mg) by mouth once daily at bedtime., Disp: , Rfl:   •  oxyCODONE-acetaminophen (Percocet) 7.5-325 mg tablet, Take 1 tablet by mouth every 4 hours if needed., Disp: , Rfl:   •   sennosides-docusate sodium (Mackenzie-Colace) 8.6-50 mg tablet, Take 1 tablet by mouth twice a day., Disp: , Rfl:   •  tamsulosin (Flomax) 0.4 mg 24 hr capsule, Take 2 capsules (0.8 mg) by mouth once daily., Disp: , Rfl:   •  vitamins A,C,E-zinc-copper (PreserVision AREDS) 4,296 mcg-226 mg-90 mg capsule, Take 1 capsule by mouth once daily., Disp: , Rfl:   •  zinc sulfate (Zincate) 220 mg (50 mg elemental) capsule, Take 1 capsule by mouth once daily., Disp: , Rfl:   •  albuterol 90 mcg/actuation inhaler, INHALE 2 PUFFS BY MOUTH EVERY 6 HOURS AS NEEDED FOR WHEEZING/SHORTNESS OF BREATH, Disp: 18 g, Rfl: 1  •  red beet root-sour cherry ext 250-0.5 mg tablet,chewable, Chew 500 mg once daily. (Patient not taking: Reported on 6/18/2025), Disp: , Rfl:   •  tiZANidine (Zanaflex) 2 mg tablet, Take 1 tablet (2 mg) by mouth every 8 hours if needed for muscle spasms for up to 10 days., Disp: 30 tablet, Rfl: 3  •  torsemide (Demadex) 20 mg tablet, Take 4 tablets (80 mg) by mouth twice a day. (Patient taking differently: Take 1 tablet (20 mg) by mouth twice a day.), Disp: , Rfl:        [1]  No family history on file.  [2]  No family history on file.  [3]    Current Outpatient Medications:   •  acetaminophen (Tylenol) 500 mg tablet, Take 1 tablet (500 mg) by mouth if needed., Disp: , Rfl:   •  albuterol 108 (90 Base) MCG/ACT inhaler, Inhale 2 puffs if needed., Disp: , Rfl:   •  allopurinol (Zyloprim) 300 mg tablet, Take 1 tablet (300 mg) by mouth once every 24 hours., Disp: , Rfl:   •  aspirin (ASPIR-81 ORAL), Take 1 tablet by mouth once daily., Disp: , Rfl:   •  atorvastatin (Lipitor) 80 mg tablet, Take 1 tablet (80 mg) by mouth once daily at bedtime., Disp: , Rfl:   •  busPIRone (Buspar) 10 mg tablet, Take 1 tablet (10 mg) by mouth 2 times a day., Disp: 60 tablet, Rfl: 11  •  cholecalciferol (Vitamin D-3) 25 mcg (1,000 units) tablet, Take 1 tablet (1,000 Units) by mouth once daily., Disp: , Rfl:   •  cyanocobalamin, vitamin B-12,  3,000 mcg capsule, Take 1 capsule by mouth once daily., Disp: , Rfl:   •  ferrous sulfate 325 mg (65 mg elemental) tablet, Take 1 tablet by mouth twice a day., Disp: , Rfl:   •  gabapentin (Neurontin) 400 mg capsule, Take 1 capsule (400 mg) by mouth 2 times a day., Disp: , Rfl:   •  gabapentin (Neurontin) 600 mg tablet, Take 1 tablet (600 mg) by mouth once daily at bedtime., Disp: , Rfl:   •  oxyCODONE-acetaminophen (Percocet) 7.5-325 mg tablet, Take 1 tablet by mouth every 4 hours if needed., Disp: , Rfl:   •  sennosides-docusate sodium (Mackenzie-Colace) 8.6-50 mg tablet, Take 1 tablet by mouth twice a day., Disp: , Rfl:   •  tamsulosin (Flomax) 0.4 mg 24 hr capsule, Take 2 capsules (0.8 mg) by mouth once daily., Disp: , Rfl:   •  vitamins A,C,E-zinc-copper (PreserVision AREDS) 4,296 mcg-226 mg-90 mg capsule, Take 1 capsule by mouth once daily., Disp: , Rfl:   •  zinc sulfate (Zincate) 220 mg (50 mg elemental) capsule, Take 1 capsule by mouth once daily., Disp: , Rfl:   •  albuterol 90 mcg/actuation inhaler, INHALE 2 PUFFS BY MOUTH EVERY 6 HOURS AS NEEDED FOR WHEEZING/SHORTNESS OF BREATH, Disp: 18 g, Rfl: 1  •  red beet root-sour cherry ext 250-0.5 mg tablet,chewable, Chew 500 mg once daily. (Patient not taking: Reported on 6/18/2025), Disp: , Rfl:   •  tiZANidine (Zanaflex) 2 mg tablet, Take 1 tablet (2 mg) by mouth every 8 hours if needed for muscle spasms for up to 10 days., Disp: 30 tablet, Rfl: 3  •  torsemide (Demadex) 20 mg tablet, Take 4 tablets (80 mg) by mouth twice a day. (Patient taking differently: Take 1 tablet (20 mg) by mouth twice a day.), Disp: , Rfl:

## 2025-07-10 NOTE — PATIENT INSTRUCTIONS
VISIT SUMMARY:  You visited us today due to severe leg wounds that have not improved with your current treatment. We discussed your heart and kidney conditions, and we have planned further tests and referrals to better manage your health issues.    YOUR PLAN:  CHRONIC VENOUS INSUFFICIENCY WITH ULCERATION: You have extensive leg wounds likely due to poor blood flow in your veins, possibly worsened by swelling or heart failure.  -We will order tests to check for venous insufficiency.  -You will be referred to Dr. Lou for evaluation and potential treatment with medical glue.  -We will coordinate with the wound care center for better management of your ulcers.  -We will discuss referring you to Dr. Barraza or Dr. Padilla for wound care.    HEART FAILURE WITH PRESERVED EJECTION FRACTION: Your heart's right side may not be working well, contributing to your leg swelling.  -We will order a complete echocardiogram to check your heart's right side and lung pressures.  -We will coordinate this test with your venous insufficiency testing.    ATRIAL FIBRILLATION: Your irregular heartbeat is managed with a biventricular pacemaker.    AORTIC VALVE REPLACEMENT: Your replaced aortic valve is working well according to your recent heart scan.    CHRONIC KIDNEY DISEASE, UNSPECIFIED: Your kidney condition is managed by Dr. Steve, and you are not on dialysis.    MOBILITY ISSUES: Your leg wounds and pain are making it hard for you to move around.  -We will help coordinate transport for your medical appointments.

## 2025-07-10 NOTE — PROGRESS NOTES
PCP: Wilfredo Calixto MD      Subjective   History of Present Illness  Nilton Uribe is a 75 year old male with heart and kidney failure who presents with severe leg wounds. He is accompanied by his wife. Pt is cousin of Dr. Chavez.    He has severe leg wounds covering 70% to 85% of his legs, with significant pain and burning sensations. The wounds have not improved despite treatment with [employer] and silver alginate dressings, and some areas have worsened. Dressing changes are challenging due to adherence, requiring extensive use of saline solution.    He has heart failure, atrial fibrillation, aortic stenosis, and kidney failure. He underwent open heart surgery with valve replacement and repairs and has a biventricular pacemaker. He is not on dialysis. He has not had recent venous insufficiency testing, though he had a vein cauterized in his thirties.    He receives home healthcare through the [facility], and a nurse recently swabbed the wounds to check for infection. He has been to the wound center at [facility] once and has a follow-up appointment scheduled. He is under the care of a cardiologist and a nephrologist and attends a heart failure clinic to manage fluid overload.    He has been hospitalized multiple times for fluid overload and is currently unable to walk or get in and out of a car without assistance, requiring hired transport for medical appointments.    Review of Systems:  Otherwise, limited cardiovascular review of systems is negative.    Past Medical History:  He has no past medical history on file.    Surgical History:   He has no past surgical history on file.    Family History:   Family History[1]Family History[2]    Social History:   Tobacco Use: Unknown (7/10/2025)    Patient History     Smoking Tobacco Use: Unknown     Smokeless Tobacco Use: Never     Passive Exposure: Not on file       Outpatient Medications:  Current Medications[3]     Allergies:  Adhesive  "tape-silicones       Objective   Vital Signs:  /52 (BP Location: Left arm, Patient Position: Sitting, BP Cuff Size: Adult)   Pulse 63   Ht 1.651 m (5' 5\")   Wt 98.4 kg (217 lb)   SpO2 94%   BMI 36.11 kg/m²     Physical Exam:  General: no acute distress  HEENT: EOMI  Lungs: Good air movement bilaterally with no wheezing/crackles/rhonchi  Cardiovascular: Regular rhythm without murmurs/rubs/gallops; JVP is normal  No carotid bruits present  Abdomen: Soft  Extremities: 2+ edema BLE and severe wounds bilaterally - see below.  Neurologic: Alert and oriented x3.              Pertinent Recent Cardiovascular Studies:  Results  DIAGNOSTIC  Echocardiogram in [place]: Left ventricular ejection fraction 62%, right ventricular enlargement with reduced systolic function, Shankar aortic valve replacement normal (12/2024)    Laboratory values:  CMP:  Recent Labs     10/30/24  1530 08/22/24  0000 02/01/23  1613 05/20/22  1325 10/22/18  1403     --  145 142 144   K 4.2  --  3.6 4.2 3.6   CL 94*  --  99 100 100   CO2 29  --  31 30 33*   ANIONGAP 20  --  19 16 15   BUN 75*  --  21 32* 25*   CREATININE 1.70*  --  1.14 1.43* 1.20   EGFR 42*  --   --   --   --    MG 1.98 1.9  --   --   --      Recent Labs     10/30/24  1530 10/22/18  1403   ALBUMIN 3.9 4.3   ALKPHOS 131 202*   ALT 12 13   AST 15 17   BILITOT 0.4 0.6     CBC:  Recent Labs     10/30/24  1530 10/22/18  1403   WBC 10.3 7.7   HGB 11.5* 10.3*   HCT 37.1* 33.2*   * 219   * 98     HEME/ENDO:  Recent Labs     10/22/18  1403   TSH 1.55   HGBA1C 4.8      CARDIAC:   Recent Labs     10/30/24  1530   *     Lipid 7/2/24 [facility]:      LDL 86      I have personally reviewed most recent PCP, cardiology, vascular, and/or podiatry documentation.      Assessment/Plan   Assessment & Plan  Chronic venous insufficiency with ulceration  Extensive leg ulceration due to suspected venous insufficiency, possibly worsened by lymphedema or heart " failure. Current wound care regimen ineffective.  - Order venous insufficiency testing.  - Refer to Dr. Lou for evaluation and potential treatment.  - Coordinate with wound care center for ulcer management - spoke with Dr. Chavez, agree with [facility] wound care for Dr. Guillaume and Dr. King.    Heart failure with preserved ejection fraction  Right heart dysfunction may contribute to venous congestion and leg swelling. Further evaluation needed.  - Order complete echocardiogram to assess right heart function and pulmonary pressures.  - Coordinate echocardiogram with venous insufficiency testing.    Atrial fibrillation  Managed with biventricular pacemaker for resynchronized therapy.    Aortic valve replacement  Shankar valve functioning normally as per recent echocardiogram.    Chronic kidney disease, unspecified  Managed by Dr. Steve, not on dialysis.    Mobility issues  Mobility impaired by leg ulceration and pain.  - Coordinate transport for medical appointments.    Communicated with Dr. Chavez and Dr. Guillaume.    I have personally reviewed and interpreted the following labs:  Hgb 7.8 (6/18/25 [facility]) - anemia, but seems c/w with prior values  Cr 1.6 (6/18/25 [facility]) - stable known CKD  Albumin 3.1 (6/18/25 [facility]) - slightly decreased  LDL 86 (7/2/24 [facility]) - adequate       This medical note was created with the assistance of artificial intelligence (AI) for documentation purposes. The content has been reviewed and confirmed by the healthcare provider for accuracy and completeness. Patient consented to the use of audio recording and use of AI during their visit.       Lloyd Quiles MD, FACC, FSCAI, RPVI  Co-Director, Vascular Center, and  Co-Director, Pulmonary Embolism Response Team,  [facility]                           Associate Professor of Medicine,                                                                [employer]              [1] No family history on file.  [2] No family history on file.  [3]   Current Outpatient Medications:     acetaminophen (Tylenol) 500 mg tablet, Take 1 tablet (500 mg) by mouth if needed., Disp: , Rfl:     albuterol 108 (90 Base) MCG/ACT inhaler, Inhale 2 puffs if needed., Disp: , Rfl:     allopurinol (Zyloprim) 300 mg tablet, Take 1 tablet (300 mg) by mouth once every 24 hours., Disp: , Rfl:     aspirin (ASPIR-81 ORAL), Take 1 tablet by mouth once daily., Disp: , Rfl:     atorvastatin (Lipitor) 80 mg tablet, Take 1 tablet (80 mg) by mouth once daily at bedtime., Disp: , Rfl:     busPIRone (Buspar) 10 mg tablet, Take 1 tablet (10 mg) by mouth 2 times a day., Disp: 60 tablet, Rfl: 11    cholecalciferol (Vitamin D-3) 25 mcg (1,000 units) tablet, Take 1 tablet (1,000 Units) by mouth once daily., Disp: , Rfl:     cyanocobalamin, vitamin B-12, 3,000 mcg capsule, Take 1 capsule by mouth once daily., Disp: , Rfl:     ferrous sulfate 325 mg (65 mg elemental) tablet, Take 1 tablet by mouth twice a day., Disp: , Rfl:     gabapentin (Neurontin) 400 mg capsule, Take 1 capsule (400 mg) by mouth 2 times a day., Disp: , Rfl:     gabapentin (Neurontin) 600 mg tablet, Take 1 tablet (600 mg) by mouth once daily at bedtime., Disp: , Rfl:     oxyCODONE-acetaminophen (Percocet) 7.5-325 mg tablet, Take 1 tablet by mouth every 4 hours if needed., Disp: , Rfl:     sennosides-docusate sodium (Mackenzie-Colace) 8.6-50 mg tablet, Take 1 tablet by mouth twice a day., Disp: , Rfl:     tamsulosin (Flomax) 0.4 mg 24 hr capsule, Take 2 capsules (0.8 mg) by mouth once daily., Disp: , Rfl:     vitamins A,C,E-zinc-copper (PreserVision AREDS) 4,296 mcg-226 mg-90 mg capsule, Take 1 capsule by mouth once daily., Disp: , Rfl:     zinc sulfate (Zincate) 220 mg (50 mg elemental) capsule, Take 1 capsule by mouth once daily., Disp: , Rfl:     albuterol 90 mcg/actuation inhaler, INHALE 2 PUFFS BY MOUTH EVERY 6 HOURS AS NEEDED FOR WHEEZING/SHORTNESS OF BREATH,  Disp: 18 g, Rfl: 1    red beet root-sour cherry ext 250-0.5 mg tablet,chewable, Chew 500 mg once daily. (Patient not taking: Reported on 6/18/2025), Disp: , Rfl:     tiZANidine (Zanaflex) 2 mg tablet, Take 1 tablet (2 mg) by mouth every 8 hours if needed for muscle spasms for up to 10 days., Disp: 30 tablet, Rfl: 3    torsemide (Demadex) 20 mg tablet, Take 4 tablets (80 mg) by mouth twice a day. (Patient taking differently: Take 1 tablet (20 mg) by mouth twice a day.), Disp: , Rfl:

## 2025-07-21 ENCOUNTER — APPOINTMENT (OUTPATIENT)
Dept: CARDIOLOGY | Facility: CLINIC | Age: 75
End: 2025-07-21
Payer: MEDICARE

## 2025-07-21 ENCOUNTER — APPOINTMENT (OUTPATIENT)
Dept: VASCULAR MEDICINE | Facility: CLINIC | Age: 75
End: 2025-07-21
Payer: MEDICARE

## 2025-08-01 ENCOUNTER — APPOINTMENT (OUTPATIENT)
Dept: PRIMARY CARE | Facility: CLINIC | Age: 75
End: 2025-08-01
Payer: MEDICARE

## 2025-08-11 ENCOUNTER — APPOINTMENT (OUTPATIENT)
Dept: CARDIOLOGY | Facility: CLINIC | Age: 75
End: 2025-08-11
Payer: MEDICARE

## 2025-08-11 ENCOUNTER — APPOINTMENT (OUTPATIENT)
Dept: VASCULAR MEDICINE | Facility: CLINIC | Age: 75
End: 2025-08-11
Payer: MEDICARE

## 2025-08-13 ENCOUNTER — APPOINTMENT (OUTPATIENT)
Dept: PRIMARY CARE | Facility: CLINIC | Age: 75
End: 2025-08-13
Payer: MEDICARE

## 2025-08-19 ENCOUNTER — PATIENT OUTREACH (OUTPATIENT)
Dept: PRIMARY CARE | Facility: CLINIC | Age: 75
End: 2025-08-19
Payer: MEDICARE

## 2025-08-19 ENCOUNTER — TELEPHONE (OUTPATIENT)
Dept: PODIATRY | Facility: CLINIC | Age: 75
End: 2025-08-19
Payer: MEDICARE

## 2025-08-19 RX ORDER — MIDODRINE HYDROCHLORIDE 10 MG/1
10 TABLET ORAL 3 TIMES DAILY
COMMUNITY
Start: 2025-08-18

## 2025-08-19 RX ORDER — DICYCLOMINE HYDROCHLORIDE 10 MG/1
10 CAPSULE ORAL
COMMUNITY
Start: 2025-08-17 | End: 2025-09-16

## 2025-08-19 RX ORDER — AMIODARONE HYDROCHLORIDE 400 MG/1
400 TABLET ORAL
COMMUNITY
Start: 2025-08-19

## 2025-08-21 ENCOUNTER — TELEPHONE (OUTPATIENT)
Dept: PRIMARY CARE | Facility: CLINIC | Age: 75
End: 2025-08-21
Payer: MEDICARE

## 2025-08-21 DIAGNOSIS — R06.00 DYSPNEA, UNSPECIFIED TYPE: Primary | ICD-10-CM

## 2025-08-21 DIAGNOSIS — R06.00 DYSPNEA, UNSPECIFIED TYPE: ICD-10-CM

## 2025-08-21 RX ORDER — ALBUTEROL SULFATE 0.83 MG/ML
2.5 SOLUTION RESPIRATORY (INHALATION) 4 TIMES DAILY PRN
Qty: 90 ML | Refills: 1 | Status: SHIPPED | OUTPATIENT
Start: 2025-08-21 | End: 2026-08-21

## 2025-08-22 DIAGNOSIS — R06.00 DYSPNEA, UNSPECIFIED TYPE: Primary | ICD-10-CM

## 2025-08-22 RX ORDER — ALBUTEROL SULFATE 90 UG/1
2 INHALANT RESPIRATORY (INHALATION) EVERY 6 HOURS PRN
Qty: 18 G | Refills: 11 | Status: SHIPPED | OUTPATIENT
Start: 2025-08-22 | End: 2026-08-22

## 2025-08-22 RX ORDER — ALBUTEROL SULFATE 90 UG/1
2 INHALANT RESPIRATORY (INHALATION) EVERY 6 HOURS PRN
COMMUNITY

## 2025-08-25 RX ORDER — ALBUTEROL SULFATE 90 UG/1
2 INHALANT RESPIRATORY (INHALATION) EVERY 6 HOURS PRN
Qty: 18 G | Refills: 2 | OUTPATIENT
Start: 2025-08-25

## 2025-09-25 ENCOUNTER — APPOINTMENT (OUTPATIENT)
Dept: CARDIOLOGY | Facility: CLINIC | Age: 75
End: 2025-09-25
Payer: MEDICARE